# Patient Record
Sex: MALE | Race: WHITE | NOT HISPANIC OR LATINO
[De-identification: names, ages, dates, MRNs, and addresses within clinical notes are randomized per-mention and may not be internally consistent; named-entity substitution may affect disease eponyms.]

---

## 2017-09-06 ENCOUNTER — APPOINTMENT (OUTPATIENT)
Dept: CARDIOLOGY | Facility: CLINIC | Age: 27
End: 2017-09-06
Payer: COMMERCIAL

## 2017-09-06 PROCEDURE — 93880 EXTRACRANIAL BILAT STUDY: CPT | Mod: 26

## 2017-09-14 PROBLEM — Z00.00 ENCOUNTER FOR PREVENTIVE HEALTH EXAMINATION: Status: ACTIVE | Noted: 2017-09-14

## 2022-10-10 ENCOUNTER — EMERGENCY (EMERGENCY)
Facility: HOSPITAL | Age: 32
LOS: 1 days | Discharge: HOME | End: 2022-10-10
Attending: STUDENT IN AN ORGANIZED HEALTH CARE EDUCATION/TRAINING PROGRAM

## 2022-10-10 VITALS
OXYGEN SATURATION: 98 % | SYSTOLIC BLOOD PRESSURE: 154 MMHG | TEMPERATURE: 98 F | RESPIRATION RATE: 18 BRPM | DIASTOLIC BLOOD PRESSURE: 107 MMHG | HEART RATE: 92 BPM

## 2022-10-10 DIAGNOSIS — F17.200 NICOTINE DEPENDENCE, UNSPECIFIED, UNCOMPLICATED: ICD-10-CM

## 2022-10-10 DIAGNOSIS — R00.2 PALPITATIONS: ICD-10-CM

## 2022-10-10 DIAGNOSIS — R20.0 ANESTHESIA OF SKIN: ICD-10-CM

## 2022-10-10 DIAGNOSIS — Z20.822 CONTACT WITH AND (SUSPECTED) EXPOSURE TO COVID-19: ICD-10-CM

## 2022-10-10 DIAGNOSIS — R07.89 OTHER CHEST PAIN: ICD-10-CM

## 2022-10-10 DIAGNOSIS — R20.2 PARESTHESIA OF SKIN: ICD-10-CM

## 2022-10-10 DIAGNOSIS — R42 DIZZINESS AND GIDDINESS: ICD-10-CM

## 2022-10-10 LAB
ALBUMIN SERPL ELPH-MCNC: 3.9 G/DL — SIGNIFICANT CHANGE UP (ref 3.3–5)
ALP SERPL-CCNC: 83 U/L — SIGNIFICANT CHANGE UP (ref 40–120)
ALT FLD-CCNC: 51 U/L — SIGNIFICANT CHANGE UP (ref 12–78)
ANION GAP SERPL CALC-SCNC: 8 MMOL/L — SIGNIFICANT CHANGE UP (ref 5–17)
AST SERPL-CCNC: 25 U/L — SIGNIFICANT CHANGE UP (ref 15–37)
BASOPHILS # BLD AUTO: 0.03 K/UL — SIGNIFICANT CHANGE UP (ref 0–0.2)
BASOPHILS NFR BLD AUTO: 0.2 % — SIGNIFICANT CHANGE UP (ref 0–2)
BILIRUB SERPL-MCNC: 0.5 MG/DL — SIGNIFICANT CHANGE UP (ref 0.2–1.2)
BUN SERPL-MCNC: 15 MG/DL — SIGNIFICANT CHANGE UP (ref 7–23)
CALCIUM SERPL-MCNC: 9 MG/DL — SIGNIFICANT CHANGE UP (ref 8.5–10.1)
CHLORIDE SERPL-SCNC: 103 MMOL/L — SIGNIFICANT CHANGE UP (ref 96–108)
CO2 SERPL-SCNC: 28 MMOL/L — SIGNIFICANT CHANGE UP (ref 22–31)
CREAT SERPL-MCNC: 1.29 MG/DL — SIGNIFICANT CHANGE UP (ref 0.5–1.3)
EGFR: 76 ML/MIN/1.73M2 — SIGNIFICANT CHANGE UP
EOSINOPHIL # BLD AUTO: 0.02 K/UL — SIGNIFICANT CHANGE UP (ref 0–0.5)
EOSINOPHIL NFR BLD AUTO: 0.1 % — SIGNIFICANT CHANGE UP (ref 0–6)
FLUAV AG NPH QL: SIGNIFICANT CHANGE UP
FLUBV AG NPH QL: SIGNIFICANT CHANGE UP
GLUCOSE SERPL-MCNC: 93 MG/DL — SIGNIFICANT CHANGE UP (ref 70–99)
HCT VFR BLD CALC: 45.9 % — SIGNIFICANT CHANGE UP (ref 39–50)
HGB BLD-MCNC: 15.2 G/DL — SIGNIFICANT CHANGE UP (ref 13–17)
IMM GRANULOCYTES NFR BLD AUTO: 0.4 % — SIGNIFICANT CHANGE UP (ref 0–0.9)
LYMPHOCYTES # BLD AUTO: 1.65 K/UL — SIGNIFICANT CHANGE UP (ref 1–3.3)
LYMPHOCYTES # BLD AUTO: 11.2 % — LOW (ref 13–44)
MCHC RBC-ENTMCNC: 28.7 PG — SIGNIFICANT CHANGE UP (ref 27–34)
MCHC RBC-ENTMCNC: 33.1 G/DL — SIGNIFICANT CHANGE UP (ref 32–36)
MCV RBC AUTO: 86.8 FL — SIGNIFICANT CHANGE UP (ref 80–100)
MONOCYTES # BLD AUTO: 0.6 K/UL — SIGNIFICANT CHANGE UP (ref 0–0.9)
MONOCYTES NFR BLD AUTO: 4.1 % — SIGNIFICANT CHANGE UP (ref 2–14)
NEUTROPHILS # BLD AUTO: 12.34 K/UL — HIGH (ref 1.8–7.4)
NEUTROPHILS NFR BLD AUTO: 84 % — HIGH (ref 43–77)
NRBC # BLD: 0 /100 WBCS — SIGNIFICANT CHANGE UP (ref 0–0)
NT-PROBNP SERPL-SCNC: 12 PG/ML — SIGNIFICANT CHANGE UP (ref 0–125)
PLATELET # BLD AUTO: 253 K/UL — SIGNIFICANT CHANGE UP (ref 150–400)
POTASSIUM SERPL-MCNC: 4.2 MMOL/L — SIGNIFICANT CHANGE UP (ref 3.5–5.3)
POTASSIUM SERPL-SCNC: 4.2 MMOL/L — SIGNIFICANT CHANGE UP (ref 3.5–5.3)
PROT SERPL-MCNC: 7.4 GM/DL — SIGNIFICANT CHANGE UP (ref 6–8.3)
RBC # BLD: 5.29 M/UL — SIGNIFICANT CHANGE UP (ref 4.2–5.8)
RBC # FLD: 12 % — SIGNIFICANT CHANGE UP (ref 10.3–14.5)
SARS-COV-2 RNA SPEC QL NAA+PROBE: SIGNIFICANT CHANGE UP
SODIUM SERPL-SCNC: 139 MMOL/L — SIGNIFICANT CHANGE UP (ref 135–145)
TROPONIN I, HIGH SENSITIVITY RESULT: 6 NG/L — SIGNIFICANT CHANGE UP
WBC # BLD: 14.7 K/UL — HIGH (ref 3.8–10.5)
WBC # FLD AUTO: 14.7 K/UL — HIGH (ref 3.8–10.5)

## 2022-10-10 PROCEDURE — 71046 X-RAY EXAM CHEST 2 VIEWS: CPT | Mod: 26

## 2022-10-10 PROCEDURE — 99285 EMERGENCY DEPT VISIT HI MDM: CPT

## 2022-10-10 PROCEDURE — 93010 ELECTROCARDIOGRAM REPORT: CPT

## 2022-10-10 NOTE — ED PROVIDER NOTE - PATIENT PORTAL LINK FT
You can access the FollowMyHealth Patient Portal offered by [facility] by registering at the following website: [email or web address]. By joining [facility]’s FollowMyHealth portal, you will also be able to view your health information using other applications (apps) compatible with our system.

## 2022-10-10 NOTE — ED PROVIDER NOTE - CLINICAL SUMMARY MEDICAL DECISION MAKING FREE TEXT BOX
Will obtain cardiac enzyme, X-ray. Will obtain cardiac enzyme, X-ray. f/u cardiologist as outpatient

## 2022-10-10 NOTE — ED ADULT TRIAGE NOTE - CHIEF COMPLAINT QUOTE
pt c/o palpitation, chest pressure  and weakness since 3pm this afternoon. states, he had an abnormal EKG at urgent care.

## 2022-10-10 NOTE — ED PROVIDER NOTE - OBJECTIVE STATEMENT
33 y/o M presents to the ED for palpitations since afternoon. Pt states that he got home from work and started having dizziness, chest pressure, tingling/numbness to L arm. Pt went to [facility] and got EKG but was told that the results were abnormal. Pt was sent by [facility] urgent care to ED for eval. Pt takes ozempic and reports tat the dose of ozempic was increased last week and he is feeling off since then. Pt states that he had theses similar symptoms before. So, pt went to the HCM specialist and was ruled out for Hypertrophic cardiomyopathy as a cause of symptoms. Additionally, pt reports that MRI results were fine. Pt admits smoking occasionally.

## 2022-10-10 NOTE — ED PROVIDER NOTE - EKG ADDITIONAL INFORMATION FREE TEXT
NSR, w/o ST changes, no sign of WPW, no EV blocks, no Brugada, no LVH/IVH, no signs of HCM on EKG, QT interval 418.

## 2022-10-18 ENCOUNTER — APPOINTMENT (OUTPATIENT)
Dept: CARDIOLOGY | Facility: CLINIC | Age: 32
End: 2022-10-18

## 2022-10-18 VITALS — SYSTOLIC BLOOD PRESSURE: 128 MMHG | HEART RATE: 90 BPM | DIASTOLIC BLOOD PRESSURE: 68 MMHG

## 2022-10-18 DIAGNOSIS — F17.210 NICOTINE DEPENDENCE, CIGARETTES, UNCOMPLICATED: ICD-10-CM

## 2022-10-18 DIAGNOSIS — Z82.49 FAMILY HISTORY OF ISCHEMIC HEART DISEASE AND OTHER DISEASES OF THE CIRCULATORY SYSTEM: ICD-10-CM

## 2022-10-18 DIAGNOSIS — I10 ESSENTIAL (PRIMARY) HYPERTENSION: ICD-10-CM

## 2022-10-18 DIAGNOSIS — Z86.79 PERSONAL HISTORY OF OTHER DISEASES OF THE CIRCULATORY SYSTEM: ICD-10-CM

## 2022-10-18 PROCEDURE — 99204 OFFICE O/P NEW MOD 45 MIN: CPT

## 2022-10-18 PROCEDURE — 93000 ELECTROCARDIOGRAM COMPLETE: CPT

## 2022-10-18 RX ORDER — CLINDAMYCIN PHOSPHATE 10 MG/ML
1 SOLUTION TOPICAL
Qty: 60 | Refills: 0 | Status: ACTIVE | COMMUNITY
Start: 2022-06-21

## 2022-10-18 RX ORDER — SEMAGLUTIDE 1.34 MG/ML
2 INJECTION, SOLUTION SUBCUTANEOUS
Qty: 3 | Refills: 0 | Status: ACTIVE | COMMUNITY
Start: 2022-08-30

## 2022-10-18 RX ORDER — AMOXICILLIN AND CLAVULANATE POTASSIUM 875; 125 MG/1; MG/1
875-125 TABLET, COATED ORAL
Qty: 20 | Refills: 0 | Status: ACTIVE | COMMUNITY
Start: 2022-07-12

## 2022-10-18 RX ORDER — FLUTICASONE PROPIONATE 50 UG/1
50 SPRAY, METERED NASAL
Qty: 16 | Refills: 0 | Status: ACTIVE | COMMUNITY
Start: 2022-07-12

## 2022-10-18 RX ORDER — LORATADINE 10 MG/1
10 TABLET ORAL
Qty: 20 | Refills: 0 | Status: ACTIVE | COMMUNITY
Start: 2022-07-12

## 2022-10-18 RX ORDER — OLMESARTAN MEDOXOMIL 40 MG/1
40 TABLET, FILM COATED ORAL
Qty: 90 | Refills: 0 | Status: ACTIVE | COMMUNITY
Start: 2022-08-09

## 2022-10-18 RX ORDER — COVID-19 MOLECULAR TEST ASSAY
KIT MISCELLANEOUS
Qty: 2 | Refills: 0 | Status: ACTIVE | COMMUNITY
Start: 2022-08-14

## 2022-10-18 NOTE — HISTORY OF PRESENT ILLNESS
[FreeTextEntry1] : Roman is a 32 year old male here for evaluation.\par \par He has a history of well controlled HTN, on Benicar. He is on Ozempic for weight loss.\par \par He reports an episode of chest discomfort/numbness, difficulty catching his breath, dizziness and heart racing, with near syncope, last week. He ended up going to the [facility] ER and had a normal evaluation, including EKG and troponin.\par He had a similar episode last year, which occurred after coffee, energy drinks and smoking. He saw a cardiologist at this time, and had a work up with an echocardiogram and monitor. He was diagnosed with HCM, and eventually saw an expert at [facility], who informed him that the diagnosis was incorrect after evaluation and a cardiac MRI.\par \par The symptoms last week seemed to have started after increasing his Ozempic dose. He is now back on the lower dose. He also had some viral symptoms, and his WBC was mildly elevated at 14.\par He lifts though generally does not do cardio.\par \par He is a smoker, though has quit for now. He has a strong family history of CAD. His father had a fatal MI in his 50's. \par \par

## 2022-10-18 NOTE — DISCUSSION/SUMMARY
[FreeTextEntry1] : Roman recently presented with chest tightness, dyspnea and palpitations, which seems to have occurred in the setting of dehydration, stress, an increased dose of Ozempic and a viral syndrome/mild leukocytosis. \par \par He is doing better today. He had an echo/MRI in 2021 which showed no evidence of HCM. His BP is at goal and physical exam is unremarkable. \par \par He will remain on his current dose of Benicar. I am setting him up for an exercise stress test, given his symptoms and strong family history of CAD.\par He will stay hydrated, avoid caffeine and smoking. He needs to get back to some cardio, and try to lose weight.\par We will speak after the above testing and arrange follow up.  [EKG obtained to assist in diagnosis and management of assessed problem(s)] : EKG obtained to assist in diagnosis and management of assessed problem(s)

## 2022-10-27 ENCOUNTER — APPOINTMENT (OUTPATIENT)
Dept: CARDIOLOGY | Facility: CLINIC | Age: 32
End: 2022-10-27

## 2022-10-27 DIAGNOSIS — R07.89 OTHER CHEST PAIN: ICD-10-CM

## 2022-10-27 DIAGNOSIS — R06.00 DYSPNEA, UNSPECIFIED: ICD-10-CM

## 2022-10-27 PROCEDURE — 93015 CV STRESS TEST SUPVJ I&R: CPT

## 2024-08-23 ENCOUNTER — APPOINTMENT (OUTPATIENT)
Dept: ORTHOPEDIC SURGERY | Facility: CLINIC | Age: 34
End: 2024-08-23
Payer: COMMERCIAL

## 2024-08-23 VITALS — WEIGHT: 315 LBS | HEIGHT: 70 IN | BODY MASS INDEX: 45.1 KG/M2

## 2024-08-23 DIAGNOSIS — M75.41 IMPINGEMENT SYNDROME OF RIGHT SHOULDER: ICD-10-CM

## 2024-08-23 DIAGNOSIS — E78.00 PURE HYPERCHOLESTEROLEMIA, UNSPECIFIED: ICD-10-CM

## 2024-08-23 DIAGNOSIS — M25.312 OTHER INSTABILITY, LEFT SHOULDER: ICD-10-CM

## 2024-08-23 PROCEDURE — 73010 X-RAY EXAM OF SHOULDER BLADE: CPT | Mod: LT

## 2024-08-23 PROCEDURE — 99204 OFFICE O/P NEW MOD 45 MIN: CPT

## 2024-08-23 PROCEDURE — 73030 X-RAY EXAM OF SHOULDER: CPT | Mod: RT

## 2024-08-23 RX ORDER — MELOXICAM 15 MG/1
15 TABLET ORAL
Qty: 30 | Refills: 0 | Status: ACTIVE | COMMUNITY
Start: 2024-08-23 | End: 1900-01-01
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
## 2024-08-23 NOTE — ASSESSMENT
[FreeTextEntry1] : Traumatic left shoulder pain in this 35yo M with a subluxation type picture after injury. Exam with guarding today. Will obtain MRI to evaluate for labral tear. Right shoulder seemingly an impingement type picture. Advised course of PT. Meloxicam 15mg sent to pharmacy today to be taken as needed. RTC after MRI to review results. Prognosis is uncertain at this time.   The patient's current medication management of their orthopedic diagnosis was reviewed today: (1) We discussed a comprehensive treatment plan that included pharmaceutical management involving the use of prescription medications. (2) There is a moderate risk of morbidity with further treatment, especially from use of prescription strength medications and possible side effects of these medications which include upset stomach with oral medications, skin reactions to topical medications and cardiac/renal/diabetes issues with long term use. (3) I recommended that the patient follow-up with their medical physician to discuss any significant specific potential issues with long term medication use such as interactions with current medications or with exacerbation of underlying medical comorbidities. (4) The benefits and risks associated with use of injectable, oral or topical, prescription and over the counter anti-inflammatory medications were discussed with the patient. The patient voiced understanding of the risks including but not limited to bleeding, stroke, kidney dysfunction, heart disease, and were referred to the black box warning label for further information.

## 2024-08-23 NOTE — IMAGING
[Bilateral] : shoulder bilaterally [There are no fractures, subluxations or dislocations. No significant abnormalities are seen] : There are no fractures, subluxations or dislocations. No significant abnormalities are seen [de-identified] : Left Shoulder:  No swelling, no ecchymosis, no alaina deformity Tenderness to palpation over anterior shoulder No instability or tenderness over AC joint Full range of motion with pain 5/5 supraspinatus, infraspinatus and subscapularis; there is pain with strength testing Positive Cline No anterior or posterior shift, no sulcus sign Negative apprehension Motor and sensory intact distally  Right Shoulder Exam:  No swelling, no ecchymosis, no alaina deformity Tenderness to palpation over greater tuberosity No instability or tenderness over AC joint Full range of motion with pain 5/5 supraspinatus, infraspinatus and subscapularis; there is pain with strength testing Positive Alarcon test, positive impingement sign Speeds and yergason negative Motor and sensory intact distally [FreeTextEntry4] : L ORIF - components well fixed

## 2024-08-30 ENCOUNTER — APPOINTMENT (OUTPATIENT)
Dept: MRI IMAGING | Facility: CLINIC | Age: 34
End: 2024-08-30

## 2024-08-30 PROCEDURE — 73221 MRI JOINT UPR EXTREM W/O DYE: CPT | Mod: LT

## 2024-09-13 ENCOUNTER — APPOINTMENT (OUTPATIENT)
Dept: ORTHOPEDIC SURGERY | Facility: CLINIC | Age: 34
End: 2024-09-13
Payer: COMMERCIAL

## 2024-09-13 DIAGNOSIS — M75.41 IMPINGEMENT SYNDROME OF RIGHT SHOULDER: ICD-10-CM

## 2024-09-13 DIAGNOSIS — M75.52 BURSITIS OF LEFT SHOULDER: ICD-10-CM

## 2024-09-13 DIAGNOSIS — M67.912 UNSPECIFIED DISORDER OF SYNOVIUM AND TENDON, LEFT SHOULDER: ICD-10-CM

## 2024-09-13 DIAGNOSIS — M75.82 OTHER SHOULDER LESIONS, LEFT SHOULDER: ICD-10-CM

## 2024-09-13 DIAGNOSIS — M24.112 OTHER ARTICULAR CARTILAGE DISORDERS, LEFT SHOULDER: ICD-10-CM

## 2024-09-13 PROCEDURE — 99213 OFFICE O/P EST LOW 20 MIN: CPT

## 2024-09-13 NOTE — IMAGING
[de-identified] : Left Shoulder:  No swelling, no ecchymosis, no alaina deformity Tenderness to palpation over anterior shoulder No instability or tenderness over AC joint Full range of motion with pain 5/5 supraspinatus, infraspinatus and subscapularis; there is pain with strength testing Positive Cline No anterior or posterior shift, no sulcus sign Negative apprehension Motor and sensory intact distally  Right Shoulder Exam:  No swelling, no ecchymosis, no alaina deformity Tenderness to palpation over greater tuberosity No instability or tenderness over AC joint Full range of motion with pain 5/5 supraspinatus, infraspinatus and subscapularis; there is pain with strength testing Positive Alarcon test, positive impingement sign Speeds and yergason negative Motor and sensory intact distally

## 2024-09-13 NOTE — ASSESSMENT
[FreeTextEntry1] : Reviewed MRI in detail.  Mostly degenerative changes which are age-appropriate.  Some edema over the distal clavicle.  No evidence of dislocation.  We reviewed all of this in detail.  Course of physical therapy is advised.  Follow-up in 6 weeks if no improvement we discussed a subacromial injection.  Eventually may benefit from glenohumeral debridement subacromial decompression but I think unlikely in this case.

## 2024-09-13 NOTE — DATA REVIEWED
[MRI] : MRI [Left] : left [Shoulder] : shoulder [Report was reviewed and noted in the chart] : The report was reviewed and noted in the chart [I independently reviewed and interpreted images and report] : I independently reviewed and interpreted images and report
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
## 2024-09-23 ENCOUNTER — RX RENEWAL (OUTPATIENT)
Age: 34
End: 2024-09-23

## 2024-10-25 ENCOUNTER — APPOINTMENT (OUTPATIENT)
Dept: ORTHOPEDIC SURGERY | Facility: CLINIC | Age: 34
End: 2024-10-25